# Patient Record
Sex: FEMALE | Race: WHITE | NOT HISPANIC OR LATINO | Employment: FULL TIME | URBAN - METROPOLITAN AREA
[De-identification: names, ages, dates, MRNs, and addresses within clinical notes are randomized per-mention and may not be internally consistent; named-entity substitution may affect disease eponyms.]

---

## 2018-11-18 ENCOUNTER — HOSPITAL ENCOUNTER (EMERGENCY)
Facility: HOSPITAL | Age: 38
Discharge: HOME | End: 2018-11-18
Attending: EMERGENCY MEDICINE
Payer: COMMERCIAL

## 2018-11-18 VITALS
SYSTOLIC BLOOD PRESSURE: 124 MMHG | DIASTOLIC BLOOD PRESSURE: 76 MMHG | WEIGHT: 172.81 LBS | RESPIRATION RATE: 18 BRPM | TEMPERATURE: 98 F | HEART RATE: 90 BPM | OXYGEN SATURATION: 99 %

## 2018-11-18 DIAGNOSIS — H60.312 ACUTE DIFFUSE OTITIS EXTERNA OF LEFT EAR: Primary | ICD-10-CM

## 2018-11-18 PROCEDURE — 99283 EMERGENCY DEPT VISIT LOW MDM: CPT

## 2018-11-18 RX ORDER — NEOMYCIN SULFATE, POLYMYXIN B SULFATE AND HYDROCORTISONE 10; 3.5; 1 MG/ML; MG/ML; [USP'U]/ML
4 SUSPENSION/ DROPS AURICULAR (OTIC) 3 TIMES DAILY
Qty: 10 ML | Refills: 0 | Status: SHIPPED | OUTPATIENT
Start: 2018-11-18 | End: 2018-11-28

## 2018-11-18 RX ORDER — GABAPENTIN 600 MG/1
600 TABLET ORAL 3 TIMES DAILY
COMMUNITY

## 2018-11-18 RX ORDER — CLINDAMYCIN HYDROCHLORIDE 150 MG/1
450 CAPSULE ORAL EVERY 8 HOURS
Qty: 45 CAPSULE | Refills: 0 | Status: SHIPPED | OUTPATIENT
Start: 2018-11-18 | End: 2018-11-23

## 2018-11-18 NOTE — ED PROVIDER NOTES
Encounter Date: 2018       History     Chief Complaint   Patient presents with    Otalgia     left ear for a few days, no fever.      The history is provided by the patient.   Otalgia   The current episode started several days ago. There is pain in the left ear. The problem occurs constantly. The problem has been unchanged. Associated symptoms include ear discharge. Pertinent negatives include no sore throat, no diarrhea, no vomiting, no neck pain, no cough and no rash.     Review of patient's allergies indicates:  No Known Allergies  History reviewed. No pertinent past medical history.  Past Surgical History:   Procedure Laterality Date     SECTION       History reviewed. No pertinent family history.  Social History     Tobacco Use    Smoking status: Current Every Day Smoker   Substance Use Topics    Alcohol use: No     Frequency: Never    Drug use: No     Review of Systems   Constitutional: Negative for fever.   HENT: Positive for ear discharge and ear pain. Negative for sore throat.    Respiratory: Negative for cough and shortness of breath.    Cardiovascular: Negative for chest pain.   Gastrointestinal: Negative for diarrhea, nausea and vomiting.   Genitourinary: Negative for dysuria.   Musculoskeletal: Negative for back pain and neck pain.   Skin: Negative for rash.   Neurological: Negative for weakness.   Hematological: Does not bruise/bleed easily.       Physical Exam     Initial Vitals [18 1731]   BP Pulse Resp Temp SpO2   124/76 90 18 97.5 °F (36.4 °C) 99 %      MAP       --         Physical Exam    Nursing note and vitals reviewed.  Constitutional: She appears well-developed and well-nourished. No distress.   HENT:   Head: Normocephalic and atraumatic.   Right Ear: Tympanic membrane normal.   Left Ear: There is drainage and swelling. Tympanic membrane is not erythematous, not retracted and not bulging.   Mouth/Throat: Oropharynx is clear and moist.   Pain with movement of the left  pinna   Eyes: Conjunctivae and EOM are normal. Pupils are equal, round, and reactive to light.   Neck: Normal range of motion. Neck supple.   Cardiovascular: Normal rate, regular rhythm and normal heart sounds. Exam reveals no gallop and no friction rub.    No murmur heard.  Pulmonary/Chest: Breath sounds normal. No respiratory distress. She has no wheezes. She has no rhonchi. She has no rales.   Abdominal: Soft. Bowel sounds are normal. She exhibits no distension and no mass. There is no tenderness. There is no rebound and no guarding.   Musculoskeletal: Normal range of motion. She exhibits no edema or tenderness.   Neurological: She is alert and oriented to person, place, and time. She has normal strength.   Skin: Skin is warm and dry. No rash noted.   Psychiatric: She has a normal mood and affect. Thought content normal.         ED Course   Procedures  Labs Reviewed - No data to display       Imaging Results    None                               Clinical Impression:       ICD-10-CM ICD-9-CM   1. Acute diffuse otitis externa of left ear H60.312 380.10           Disposition:   Disposition: Discharged  Condition: Stable                        Ramiro Boles MD  11/18/18 2590

## 2019-01-07 ENCOUNTER — HOSPITAL ENCOUNTER (EMERGENCY)
Facility: HOSPITAL | Age: 39
Discharge: HOME | End: 2019-01-07
Attending: EMERGENCY MEDICINE

## 2019-01-07 VITALS
OXYGEN SATURATION: 100 % | HEART RATE: 78 BPM | BODY MASS INDEX: 26.23 KG/M2 | HEIGHT: 67 IN | SYSTOLIC BLOOD PRESSURE: 120 MMHG | DIASTOLIC BLOOD PRESSURE: 74 MMHG | TEMPERATURE: 99 F | WEIGHT: 167.13 LBS | RESPIRATION RATE: 18 BRPM

## 2019-01-07 DIAGNOSIS — B96.89 LOCALIZED BACTERIAL INFECTION OF SKIN: ICD-10-CM

## 2019-01-07 DIAGNOSIS — L08.9 LOCALIZED BACTERIAL INFECTION OF SKIN: ICD-10-CM

## 2019-01-07 DIAGNOSIS — Z20.828 EXPOSURE TO INFLUENZA: ICD-10-CM

## 2019-01-07 DIAGNOSIS — J20.9 ACUTE BRONCHITIS, UNSPECIFIED ORGANISM: Primary | ICD-10-CM

## 2019-01-07 DIAGNOSIS — R11.2 NON-INTRACTABLE VOMITING WITH NAUSEA, UNSPECIFIED VOMITING TYPE: ICD-10-CM

## 2019-01-07 DIAGNOSIS — K08.89 PAIN, DENTAL: ICD-10-CM

## 2019-01-07 PROBLEM — H60.61 CHRONIC OTITIS EXTERNA OF RIGHT EAR: Status: ACTIVE | Noted: 2018-12-06

## 2019-01-07 LAB
INFLUENZA A, MOLECULAR: NEGATIVE
INFLUENZA B, MOLECULAR: NEGATIVE
SPECIMEN SOURCE: NORMAL

## 2019-01-07 PROCEDURE — 87502 INFLUENZA DNA AMP PROBE: CPT | Mod: ER

## 2019-01-07 PROCEDURE — 99282 EMERGENCY DEPT VISIT SF MDM: CPT | Mod: ER

## 2019-01-07 RX ORDER — MUPIROCIN 20 MG/G
OINTMENT TOPICAL
Qty: 22 G | Refills: 0 | Status: SHIPPED | OUTPATIENT
Start: 2019-01-07

## 2019-01-07 RX ORDER — AMOXICILLIN AND CLAVULANATE POTASSIUM 875; 125 MG/1; MG/1
1 TABLET, FILM COATED ORAL 2 TIMES DAILY
Qty: 20 TABLET | Refills: 0 | Status: SHIPPED | OUTPATIENT
Start: 2019-01-07 | End: 2019-01-17

## 2019-01-07 RX ORDER — OSELTAMIVIR PHOSPHATE 75 MG/1
75 CAPSULE ORAL 2 TIMES DAILY
Qty: 10 CAPSULE | Refills: 0 | Status: SHIPPED | OUTPATIENT
Start: 2019-01-07 | End: 2019-01-12

## 2019-01-07 RX ORDER — IBUPROFEN 800 MG/1
800 TABLET ORAL EVERY 6 HOURS PRN
Qty: 20 TABLET | Refills: 0 | Status: SHIPPED | OUTPATIENT
Start: 2019-01-07

## 2019-01-07 RX ORDER — ROSUVASTATIN CALCIUM 10 MG/1
10 TABLET, COATED ORAL DAILY
COMMUNITY
Start: 2018-12-06 | End: 2019-06-04

## 2019-01-07 RX ORDER — PROMETHAZINE HYDROCHLORIDE AND CODEINE PHOSPHATE 6.25; 1 MG/5ML; MG/5ML
5 SOLUTION ORAL EVERY 4 HOURS PRN
Qty: 180 ML | Refills: 0 | Status: SHIPPED | OUTPATIENT
Start: 2019-01-07 | End: 2019-01-17

## 2019-01-13 NOTE — ED PROVIDER NOTES
Encounter Date: 1/7/2019       History     Chief Complaint   Patient presents with    Emesis     States vomiting and diarrhea since yesterday. States kids have had the same for the last week. Also has toothache to front teeth     The history is provided by the patient.   URI   The primary symptoms include fever, fatigue, headaches, ear pain, sore throat, swollen glands, cough, wheezing, nausea, vomiting, myalgias and rash (to bilateral external ears). Primary symptoms do not include abdominal pain. The current episode started yesterday. This is a new problem. The problem has not changed since onset.The fever began yesterday. The fever has been unchanged since its onset. The fever has been present for 1 to 2 days. The maximum temperature recorded prior to her arrival was unknown.   The fatigue began yesterday. The fatigue has been unchanged since its onset.   The headache began yesterday. The headache is present intermittently. The pain from the headache is at a severity of 4/10. Location/region(s) of the headache: frontal. The headache is not associated with aura, photophobia, eye pain, visual change, paresthesias or loss of balance.   The ear pain began yesterday. Ear pain is a new problem. The ear pain has been unchanged since its onset. Both ears are affected.   The sore throat began yesterday. The sore throat has been unchanged since its onset. The sore throat is not accompanied by trouble swallowing, drooling, hoarse voice or stridor. The sore throat pain is at a severity of 4/10.   The swollen gland was first noticed yesterday. The swelling is not associated with speech difficulty, neck pain or neck stiffness.   The cough began 3 to 5 days ago. The cough is productive. The sputum is green.   Wheezing began yesterday. Wheezing occurs intermittently. The wheezing has been unchanged since its onset.   Nausea began yesterday.   The vomiting began yesterday. Vomiting occurred once. The emesis contains stomach  contents.   Myalgias began yesterday. The myalgias have been unchanged since their onset. The myalgias are generalized. The myalgias are aching. The myalgias are not associated with weakness, tenderness or swelling. The myalgia pain is at a severity of 4/10.   The rash began 2 to 7 days ago. The rash appears on the left ear and right ear (external ears). The rash is associated with itching and weeping.   The onset of the illness is associated with exposure to sick contacts (patient's  also has similar symptoms and patient reports that her children were diagnosed with influenza several days ago). Symptoms associated with the illness include chills, sinus pressure, congestion and rhinorrhea. The following treatments were addressed: Acetaminophen was effective. A decongestant was not tried. NSAIDs were effective.   Dental Pain   The primary symptoms include mouth pain (dental pain secondary to tooth decay of the left upper lateral incisor ), headaches, fever, sore throat and cough. Primary symptoms do not include shortness of breath. The symptoms began several days ago. The symptoms are worsening. The symptoms are recurrent.   Affected locations include: teeth. At its highest the mouth pain was at 8/10. The mouth pain is currently at 4/10.   The headache began more than 2 days ago. The headache is present intermittently. The pain from the headache is at a severity of 4/10. Location/region(s) of the headache: frontal. The headache is not associated with aura, photophobia, eye pain, visual change, paresthesias, weakness or loss of balance. The maximum temperature recorded prior to her arrival was unknown.   The sore throat began yesterday. The sore throat is not accompanied by trouble swallowing, drooling, hoarse voice or stridor. The sore throat pain is at a severity of 4/10.   Additional symptoms include: dental sensitivity to temperature, gum tenderness, ear pain, swollen glands and fatigue. Additional symptoms  do not include: jaw pain, facial swelling, trouble swallowing, pain with swallowing, dry mouth and drooling. Medical issues include: smoking and periodontal disease.   Last doses of acetaminophen and ibuprofen were within the past three hours.  She denies any further complaints or concerns.       PCP:   Primary Doctor No        Review of patient's allergies indicates:  No Known Allergies  Past Medical History:   Diagnosis Date    Hypercholesteremia     Neuropathy      Past Surgical History:   Procedure Laterality Date     SECTION      kidney stone removal      LITHOTRIPSY       History reviewed. No pertinent family history.  Social History     Tobacco Use    Smoking status: Current Every Day Smoker     Packs/day: 1.00    Smokeless tobacco: Never Used   Substance Use Topics    Alcohol use: No     Frequency: Never    Drug use: No     Review of Systems   Constitutional: Positive for chills, fatigue and fever.   HENT: Positive for congestion, dental problem (pain and decay to left upper lateral incisor), ear pain, postnasal drip, rhinorrhea, sinus pressure and sore throat. Negative for drooling, facial swelling, hoarse voice and trouble swallowing.    Eyes: Negative for photophobia and pain.   Respiratory: Positive for cough, chest tightness and wheezing. Negative for shortness of breath and stridor.    Cardiovascular: Negative for chest pain.   Gastrointestinal: Positive for nausea and vomiting. Negative for abdominal pain and diarrhea.   Genitourinary: Negative for dysuria.   Musculoskeletal: Positive for myalgias. Negative for back pain and neck pain.   Skin: Positive for itching and rash (to bilateral external ears). Negative for color change.   Neurological: Positive for headaches. Negative for dizziness, speech difficulty, weakness, paresthesias and loss of balance.   Hematological: Does not bruise/bleed easily.       Physical Exam     Initial Vitals [19 1723]   BP Pulse Resp Temp SpO2    120/74 78 18 98.9 °F (37.2 °C) 100 %      MAP       --         Physical Exam    Nursing note and vitals reviewed.  Constitutional: Vital signs are normal. She appears well-developed and well-nourished. She is cooperative. She appears ill (and tired). No distress.   HENT:   Head: Normocephalic and atraumatic.   Right Ear: Hearing and ear canal normal. There is tenderness. A middle ear effusion is present.   Left Ear: Hearing and ear canal normal. There is tenderness. A middle ear effusion is present.   Nose: Mucosal edema and sinus tenderness present.   Mouth/Throat: Uvula is midline and mucous membranes are normal. Dental caries (decay and tenderenss noted to the left upper lateral incisor - tooth #10 - no gingival involvement noted) present. Oropharyngeal exudate and posterior oropharyngeal erythema present.   Vesicular like rash resembling impetigo noted to bilateral external ears involving the antihelix and antihelical folds.  No drainage noted at this time.  Area does appear to have honey colored encrustation present.    Eyes: Conjunctivae, EOM and lids are normal. Pupils are equal, round, and reactive to light.   Neck: Trachea normal and normal range of motion. Neck supple. No spinous process tenderness present. No neck rigidity.   Cardiovascular: Normal rate, regular rhythm, intact distal pulses and normal pulses.   Pulmonary/Chest: Effort normal. No accessory muscle usage. No respiratory distress. She has no decreased breath sounds. She has wheezes (mild expiratory wheezes noted to right upper lobe - improves with coughing) in the right upper field. She has no rhonchi. She has no rales.   Abdominal: Soft. Bowel sounds are normal. She exhibits no distension and no mass. There is no tenderness. There is no rebound and no guarding.   Musculoskeletal: Normal range of motion. She exhibits no edema or tenderness.   Patient has subjective complaints of generalized body aches.    Lymphadenopathy:     She has  "cervical adenopathy (tender bilaterally).        Right cervical: Superficial cervical adenopathy present.        Left cervical: Superficial cervical adenopathy present.   Neurological: She is alert and oriented to person, place, and time. She has normal strength. No sensory deficit. Gait normal. GCS eye subscore is 4. GCS verbal subscore is 5. GCS motor subscore is 6.   Neurovascular intact to all extremities.    Skin: Skin is warm, dry and intact. Capillary refill takes less than 2 seconds. Rash noted. Rash is vesicular (to external ears - see HENT for assessment).   Normal color and turgor.    Psychiatric: She has a normal mood and affect. Her speech is normal and behavior is normal. Cognition and memory are normal.         ED Course   Procedures      ED Lab Results:   Results for orders placed or performed during the hospital encounter of 01/07/19   Influenza A & B by Molecular   Result Value Ref Range    Influenza A, Molecular Negative Negative    Influenza B, Molecular Negative Negative    Flu A & B Source Nasal swab            ED Course Vitals  Vitals:    01/07/19 1723   BP: 120/74   BP Location: Left arm   Patient Position: Sitting   Pulse: 78   Resp: 18   Temp: 98.9 °F (37.2 °C)   TempSrc: Oral   SpO2: 100%   Weight: 75.8 kg (167 lb 1.7 oz)   Height: 5' 7" (1.702 m)       1925 HOURS RE-EVALUATION & DISPOSITION:   Reassessment at the time of disposition demonstrates that the patient is resting comfortably in no acute distress.  She has remained hemodynamically stable throughout the entire ED visit and is without objective evidence for acute process requiring urgent intervention or hospitalization. I discussed test results and provided counseling to patient with regard to condition, the treatment plan, specific conditions for return, and the importance of follow up.  Answered questions at this time. The patient is stable for discharge.                 Medical Decision Making:   History:   Old Records " Summarized: records from clinic visits.  Clinical Tests:   Lab Tests: Ordered and Reviewed  ED Management:  Patient treated with Tamiflu due to symptomology and recent exposure to influenza (reports that her children were just diagnosed with influenza several days ago).                       Clinical Impression:       ICD-10-CM ICD-9-CM   1. Acute bronchitis, unspecified organism J20.9 466.0   2. Dental Pain R/T Tooth Decay - Left Upper Lateral Incisor K08.89 525.9   3. Exposure to influenza Z20.828 V01.79   4. Localized bacterial infection of skin - bilateral external ears L08.9 686.9    B96.89 041.9   5. Non-intractable vomiting with nausea, unspecified vomiting type R11.2 787.01           Disposition:   Disposition: Discharged  Condition: Stable  I discussed with patient that the evaluation in the emergency department does not suggest any emergent or life threatening medical condition requiring immediate intervention beyond what was provided in the ED, and I believe patient is safe for discharge.  Regardless, an unremarkable evaluation in the ED does not preclude the development or presence of a serious of life threatening condition. As such, patient was instructed to return immediately for any worsening or change in current symptoms. I also discussed the results of my evaluation and diagnosis with patient and she concurs with the evaluation and management plan.  Detailed written and verbal instructions provided to patient and she expressed a verbal understanding of the discharge instructions and overall management plan. Reiterated the importance of medication administration and safety and advised patient to follow up with primary care provider in 3-5 days or sooner if needed.  Also advised patient to return to the ER for any complications.     Regarding BRONCHITIS, for treatment, I encouraged patient to refrain from smoking, drink plenty of fluids, rest, take medications as prescribed, and to use a humidifier or  steam in the bathroom. Patient instructed to notify primary care provider if: they have a cough most days or have a cough that returns frequently; are coughing up blood; have a high fever or shaking chills; have a low-grade fever for three or more days; develop thick, greenish mucus, especially if it has a bad smell; and feel short of breath or have chest pain. For prevention, discussed with patient the importance of not smoking, getting annual influenza vaccines, reducing exposure to air pollution, and to frequently wash hands to avoid spread of infection.  Instructed patient to return to emergency department if they experience chest pain or shortness of breath and to follow up with primary care provider for re-evaluation.    Regarding INFLUENZA EXPOSURE, for prevention, I advised patient to: clean hands with soap and water or an alcohol-based hand rub frequently;  cover mouth and nose with bend of elbow when coughing or sneezing; limit face-to-face contact with others;  maintain good ventilation in the home; follow proper cleaning and disposal procedures for tissues, linens, and eating utensils; and keep surfaces clean (especially bedside tables, surfaces in the bathroom, doorknobs, phones, and children's toys) by wiping them down with disinfectants. For treatment, recommended that patient: get annual vaccination; get plenty of rest; drink clear fluids like water, broth, sports drinks, or electrolyte beverages; place cool, damp washcloth on forehead, arms, and legs to reduce discomfort associated with a fever; use a humidifier; gargle with warm salt water; and take over-the-counter acetaminophen or ibuprofen for pain relief and fever control. I also discussed the viral origin of influenza and treatment limitations.     Advised patient to make an appointment with dentist for examination and treatment of their dental pain, as well as routine cleaning and disease prevention.  For prevention, patient was encouraged  to: perform oral hygiene daily; have regular professional cleaning; brush teeth at least twice a day; floss daily; avoid constant sipping of sugary drinks or frequent sucking on candy and mints; and use toothpaste containing fluoride. Encouraged patient to gargle with warm salt water several times a day, continue to floss after eating, and complete full course of antibiotics.    For  CELLULITIS, I advised patient to: keep area clean and dry; apply antibiotic ointment as prescribed; refrain from squeezing or irritating site; apply moist heat to help with pain and abscess drainage; and to take antibiotics as prescribed. Patient was instructed to follow up with primary care provider, urgent care facility, or the emergency room if symptoms worsen and they develop a fever greater than 102.5 °F, have pain unrelieved by OTC or prescription medications, and excessive swelling. Also instructed patient to follow up with provider in 24-48 hours for wound re-check.    For NAUSEA/VOMITING, I advised patient on importance of staying well hydrated; eating frequent, small amounts of clear liquids; avoid solid foods until there has been no vomiting for six hours, and then work slowly back to a normal diet; and to use an OTC bismuth stomach remedy for upset stomach, nausea, indigestion, and diarrhea. We discussed signs and symptoms of dehydration and possible causes of N/V with patient (viral infections, medications, migraine headaches, food poisoning, allergies, and peptic ulcer disease).  Reiterated the importance of following up with primary care provider if no improvement is noted within 72 hours.     Patient presents with upper respiratory and flulike symptoms consistent with a viral etiology. Based on my assessment in the ED, I do not suspect any respiratory, airway, pulmonary, cardiovascular (including myocarditis), metabolic, CNS, medical, or surgical emergency medical condition. I have discussed with the patient and/or  caregiver signs and symptoms for secondary bacterial infections, such as pneumonia. I believe that the patient's symptoms are most consistent with a viral illness. Patient is safe for discharge home with conservative therapy.  I recommended that the patient treat the symptoms and recommended that they:  Rest; drink plenty of clear fluids; use nasal saline spray to clear nasal drainage and help with nasal congestion; take an antihistamine (Allegra, Claritin, or Zyrtec) to help dry mucus and postnasal drip; take Mucinex or Mucinex DM for cough and chest congestion; take ibuprofen or acetaminophen for any fever, headache, body aches, or other pain; gargle with warm salt water gargles or lozenges for throat comfort; and follow up with primary care provider if there is no improvement or a worsening of symptoms.           Follow-up Information     Schedule an appointment as soon as possible for a visit  with Dentist.           Call  Primary Care Provider.    Why:  If symptoms worsen or as needed                   Discharge Medication List as of 1/7/2019  7:34 PM      START taking these medications    Details   amoxicillin-clavulanate 875-125mg (AUGMENTIN) 875-125 mg per tablet Take 1 tablet by mouth 2 (two) times daily. for 10 days, Starting Mon 1/7/2019, Until Thu 1/17/2019, Print      ibuprofen (ADVIL,MOTRIN) 800 MG tablet Take 1 tablet (800 mg total) by mouth every 6 (six) hours as needed (Fever and/or Pain)., Starting Mon 1/7/2019, Print      mupirocin (BACTROBAN) 2 % ointment Apply topically to affected area three times daily., Print      oseltamivir (TAMIFLU) 75 MG capsule Take 1 capsule (75 mg total) by mouth 2 (two) times daily. for 5 days, Starting Mon 1/7/2019, Until Sat 1/12/2019, Print      promethazine-codeine 6.25-10 mg/5 ml (PHENERGAN WITH CODEINE) 6.25-10 mg/5 mL syrup Take 5 mLs by mouth every 4 (four) hours as needed for Cough., Starting Mon 1/7/2019, Until Thu 1/17/2019, Print           Varun ALEJANDRO  LUIS Valladares  01/13/19 1137